# Patient Record
Sex: MALE
[De-identification: names, ages, dates, MRNs, and addresses within clinical notes are randomized per-mention and may not be internally consistent; named-entity substitution may affect disease eponyms.]

---

## 2018-02-10 ENCOUNTER — TRANSCRIPTION ENCOUNTER (OUTPATIENT)
Age: 28
End: 2018-02-10

## 2018-06-25 PROBLEM — Z00.00 ENCOUNTER FOR PREVENTIVE HEALTH EXAMINATION: Status: ACTIVE | Noted: 2018-06-25

## 2019-12-23 ENCOUNTER — TRANSCRIPTION ENCOUNTER (OUTPATIENT)
Age: 29
End: 2019-12-23

## 2020-06-09 ENCOUNTER — TRANSCRIPTION ENCOUNTER (OUTPATIENT)
Age: 30
End: 2020-06-09

## 2020-06-10 ENCOUNTER — APPOINTMENT (OUTPATIENT)
Age: 30
End: 2020-06-10
Payer: MEDICAID

## 2020-06-10 DIAGNOSIS — S52.125D NONDISPLACED FRACTURE OF HEAD OF LEFT RADIUS, SUBSEQUENT ENCOUNTER FOR CLOSED FRACTURE WITH ROUTINE HEALING: ICD-10-CM

## 2020-06-10 DIAGNOSIS — S63.502D UNSPECIFIED SPRAIN OF LEFT WRIST, SUBSEQUENT ENCOUNTER: ICD-10-CM

## 2020-06-10 PROCEDURE — 99204 OFFICE O/P NEW MOD 45 MIN: CPT | Mod: 57

## 2020-06-10 PROCEDURE — 24650 CLTX RDL HEAD/NCK FX WO MNPJ: CPT | Mod: LT
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
## 2020-06-10 NOTE — PHYSICAL EXAM
[Normal Finger/nose] : finger to nose coordination [Normal] : no peripheral adenopathy appreciated [de-identified] : Left elbow exam\par \par Skin is intact with no erythema or ecchymosis. There is mild swelling of the elbow. There are no gross deformities. Range of motion 30°/90° extension/flexion. Patient able to fully pronate and supinate. Positive tenderness over the radial head. Sensation grossly intact and distal pulses are 2+.\par \par Left wrist exam\par \par Skin is intact with no erythema or ecchymosis. There is no swelling. There is no tenderness to palpation about the wrist. There is no snuffbox tenderness, SL interval tenderness, or ulnar fovea tenderness. Range of motion is fully intact with flexion and extension. Sensation grossly intact and capillary refill is brisk. [de-identified] : 4 x-ray views of the left elbow were reviewed from an outside institution. There is a small nondisplaced fracture of the radial head noted on the lateral view. [de-identified] : SANDRAR

## 2020-06-10 NOTE — ASSESSMENT
[FreeTextEntry1] : Patient with a nondisplaced radial head fracture of the left elbow. The nature of this condition was described at length with the patient he verbalized understanding. We recommend early mobilization with range of motion exercises, flexion and extension, pronation and supination. He should only wear the shoulder immobilizer for support/comfort for the next few days. Exercises were discussed at length with the patient at his visit today. He will be light activity as tolerated. Patient with a mild sprain of the left wrist as well. He will be activity as tolerated.He will follow back up in 4 weeks for range of motion check.

## 2020-07-01 ENCOUNTER — APPOINTMENT (OUTPATIENT)
Dept: ORTHOPEDIC SURGERY | Facility: CLINIC | Age: 30
End: 2020-07-01

## 2020-12-05 ENCOUNTER — TRANSCRIPTION ENCOUNTER (OUTPATIENT)
Age: 30
End: 2020-12-05

## 2020-12-20 ENCOUNTER — TRANSCRIPTION ENCOUNTER (OUTPATIENT)
Age: 30
End: 2020-12-20

## 2021-07-13 ENCOUNTER — TRANSCRIPTION ENCOUNTER (OUTPATIENT)
Age: 31
End: 2021-07-13